# Patient Record
Sex: MALE | Race: WHITE
[De-identification: names, ages, dates, MRNs, and addresses within clinical notes are randomized per-mention and may not be internally consistent; named-entity substitution may affect disease eponyms.]

---

## 2017-02-01 ENCOUNTER — HOSPITAL ENCOUNTER (EMERGENCY)
Dept: HOSPITAL 68 - ERH | Age: 27
End: 2017-02-01
Payer: COMMERCIAL

## 2017-02-01 VITALS — DIASTOLIC BLOOD PRESSURE: 73 MMHG | SYSTOLIC BLOOD PRESSURE: 136 MMHG

## 2017-02-01 DIAGNOSIS — N50.812: Primary | ICD-10-CM

## 2017-02-01 NOTE — ED GI/GU/ABDOMINAL COMPLAINT
History of Present Illness
 
General
Chief Complaint: Male Genitourinary Problems
Stated Complaint: PAIN IN TESTICLE
Source: patient
Exam Limitations: no limitations
 
Vital Signs & Intake/Output
Vital Signs & Intake/Output
 Vital Signs
 
 
Date Time Temp Pulse Resp B/P Pulse O2 O2 Flow FiO2
 
     Ox Delivery Rate 
 
2021 98.3 92 18 136/73 97 Room Air  
 
1849 98.7 88 20 151/89 98 Room Air Room Air 
 
 
Allergies
Coded Allergies:
NO KNOWN ALLERGIES (14)
 
Reconcile Medications
No Known Home Medications
 
Triage Note:
PT TO ED WITH C/O LEFT TESTICULAR PAIN X 1 WEEK,
 HAS APPT WITH , BUT "PAIN RAMPED UP THE LAST
 COUPLE OF DAYS".
Triage Nurses Notes Reviewed? yes
HPI:
This patient is a 26-year-old male who presented to the emergency department 
today for evaluation of left testicular pain 1 week.  The patient reported that
the pain began approximately one week ago after he was doing a, "fat burning 
jog."  He reported that after that time he started to have a, "1.5 out of 10," 
pain in his left testicle.  He reported that today the pain worsened and got up 
to a 5 out of 10.  The pain is currently a 1.5 out of 10.  He was unable to 
describe the pain to me.  He denied any radiation of the pain.  The pain has 
been constant.  The patient denied a chance of any STDs and refused any STD 
testing.  The patient denied any trauma to the area he denied any urethral 
discharge.  The patient denied any urinary burning, urgency, frequency, or blood
in the urine.  He denied any abdominal pain, nausea, vomiting, fevers, chills, 
or back pain
(NATHAN MARINELLI PA-C)
 
Past History
 
Travel History
Traveled to [place] past 21 day No
 
Medical History
Any Pertinent Medical History? see below for history
Neurological: NONE
EENT: NONE
Cardiovascular: NONE
Respiratory: NONE
Gastrointestinal: NONE
Hepatic: NONE
Renal: NONE
Musculoskeletal: NONE
Psychiatric: NONE
Endocrine: NONE
Blood Disorders: NONE
Cancer(s): NONE
GYN/Reproductive: NONE
Tetanus Vaccine: 16
 
Surgical History
Surgical History: non-contributory
 
Psychosocial History
What is your primary language English
Tobacco Use: Never used
ETOH Use: denies use
Illicit Drug Use: denies illicit drug use
 
Family History
Hx Contributory? No
(NATHAN MARINELLI PA-C)
 
Review of Systems
 
Review of Systems
Constitutional:
Reports: no symptoms. 
EENTM:
Reports: no symptoms. 
Respiratory:
Reports: no symptoms. 
Cardiovascular:
Reports: no symptoms. 
GI:
Reports: no symptoms. 
Genitourinary:
Reports: see HPI. 
Musculoskeletal:
Reports: no symptoms. 
Skin:
Reports: no symptoms. 
Neurological/Psychological:
Reports: no symptoms. 
All Other Systems: Reviewed and Negative
(YVES GARNETT,NATHAN)
 
Physical Exam
 
Physical Exam
Gastrointestinal: normal bowel sounds, soft, non-tender, no organomegaly
Comments:
Well-developed well-nourished person in no acute distress
HEENT: Normal EENT exam, head normocephalic, moist mucous membranes
Neck: Supple
Back: Normal gait
Respiratory: No respiratory distress.  Speaking in full sentences
Extremity: Normal and equal pulses
Neuro: Alert oriented x3, cranial nerves II through XII grossly intact.
Skin: No appreciable rash on exposed skin, skin is warm and dry.
Psych: Mood and affect is normal
 
Core Measures
ACS in differential dx? No
Severe Sepsis Present: No
Septic Shock Present: No
(YVES GARNETT,NATHAN)
 
Progress
Differential Diagnosis: appendicitis, biliary colic, bowel obstruction, colon 
cancer, diverticulitis, epididymitis, hernia, orchitis, prostatitis, 
pyelonephritis, STD, testicular torsion, ureterolithiasis, urinary retention, 
urethritis, UTI/pyelo
Plan of Care:
 Orders
 
 
Procedure Date/time Status
 
CULTURE,URINE 1931 Active
 
URINALYSIS 1931 Complete
 
 
 Laboratory Tests
 
 
 
17:
Urine Color YEL, Urine Clarity CLEAR, Urine pH 6.5, Ur Specific Gravity 1.015, 
Urine Protein NEG, Urine Ketones NEG, Urine Nitrite NEG, Urine Bilirubin NEG, 
Urine Urobilinogen 0.2, Ur Leukocyte Esterase NEG, Ur Microscopic EXAM NOT 
REQUIRED, Urine Hemoglobin NEG, Urine Glucose NEG
 Microbiology
1945  URINE ROUT: Urine Culture - RECD
 
Diagnostic Imaging:
Viewed by Me: Ultrasound.  Discussed w/RAD: Ultrasound. 
Radiology Impression: PATIENT: PENELOPE ELIZALDE  MEDICAL RECORD NO: [id number] PRESENT
AGE: 26  PATIENT ACCOUNT NO: [id number] : 88  LOCATION: [facility] ORDERING 
PHYSICIAN: JAVED RUBI     SERVICE DATE:  EXAM TYPE: US - 
US-TESTICULAR EXAMINATION: US SCROTUM CLINICAL INFORMATION: Pain in testicles. 
Assess for torsion or hydrocele. COMPARISON: Ultrasound 2014. TECHNIQUE: A
sonogram of the scrotum was performed assessing gray-scale appearance and color 
Doppler flow. FINDINGS: RIGHT: The right testis measures 4.3 x 2.4 x 3.4 cm, 
volume 24.9 mL. Parenchymal echotexture is normal. No focal testicular 
parenchymal lesions are visualized. Normal symmetric intratesticular flow is 
visualized. The right epididymal head is normal in size; there is a 0.3 cm cyst.
No right hydrocele or varicocele is seen. LEFT: The left testis measures 3.6 x 
2.3 x 3.3 cm, volume 19.4 mL. Parenchymal echotexture is normal. No focal 
testicular parenchymal lesions are visualized. Normal symmetric intratesticular 
flow is visualized. The left epididymal head is normal in size. No left 
hydrocele or varicocele is seen. IMPRESSION: 1. There is normal symmetrical flow
to both testes. 2. There is a small right epididymal head cyst. 3. There are no 
varicoceles or hydroceles. DICTATED BY: REJI MI MD  DATE/TIME DICTATED: :RAD.CHILDS  DATE/TIME TRANSCRIBED:17 
CONFIDENTIAL, DO NOT COPY WITHOUT APPROPRIATE AUTHORIZATION.  <Electronically 
signed in Other Vendor System>                                                  
                                     SIGNED BY: REJI MI MD 17
Initial ED EKG: none
Comments:
2017 8:23:11 PM: This patient has an appointment scheduled for next Tuesday 
with the urologist.  He refused testicular examination.  he also refused any STD
testing.  This patient will be discharged outpatient urology follow-up
(NATHAN MARINELLI PA-C)
 
Departure
 
Departure
Disposition: HOME OR SELF CARE
Condition: Stable
Clinical Impression
Primary Impression: Testicular pain
Referrals:
PATIENT HAS NO PRIMARY CARE DR (PCP/Family)
 
JENNY MARIA MD
 
Additional Instructions:
Please attend your previously scheduled appointment with the urologist.  Return 
to the emergency Department for any worsening symptoms or concerns.
Departure Forms:
Customer Survey
General Discharge Information
Prescriptions:
Current Visit Scripts
No Known Home Medications    
 
(NATHAN MARINELLI PA-C)
 
PA/NP Co-Sign Statement
Statement:
ED Attending supervision documentation-
 
[] I saw and evaluated the patient. I have also reviewed all the pertinent lab 
results and diagnostic results. I agree with the findings and the plan of care 
as documented in the PA's/NP's documentation. 
 
[X] I have reviewed the ED Record and agree with the PA's/NP's documentation.
 
[] Additions or exceptions (if any) to the PAs/NP's note and plan are 
summarized below:
[]
 
(NOAH BELLE,FRANCHESCA)

## 2017-02-01 NOTE — ULTRASOUND REPORT
EXAMINATION:
US SCROTUM
 
CLINICAL INFORMATION:
Pain in testicles. Assess for torsion or hydrocele.
 
COMPARISON:
Ultrasound 06/18/2014.
 
TECHNIQUE:
A sonogram of the scrotum was performed assessing gray-scale appearance and
color Doppler flow.
 
FINDINGS:
 
RIGHT: The right testis measures 4.3 x 2.4 x 3.4 cm, volume 24.9 mL.
Parenchymal echotexture is normal. No focal testicular parenchymal lesions
are visualized. Normal symmetric intratesticular flow is visualized.
 
The right epididymal head is normal in size; there is a 0.3 cm cyst. No right
hydrocele or varicocele is seen.
 
LEFT: The left testis measures 3.6 x 2.3 x 3.3 cm, volume 19.4 mL.
Parenchymal echotexture is normal. No focal testicular parenchymal lesions
are visualized. Normal symmetric intratesticular flow is visualized.
 
The left epididymal head is normal in size. No left hydrocele or varicocele
is seen.
 
IMPRESSION:
1. There is normal symmetrical flow to both testes.
 
2. There is a small right epididymal head cyst.
 
3. There are no varicoceles or hydroceles.

## 2017-06-17 ENCOUNTER — HOSPITAL ENCOUNTER (EMERGENCY)
Dept: HOSPITAL 68 - ERH | Age: 27
End: 2017-06-17
Payer: COMMERCIAL

## 2017-06-17 VITALS — BODY MASS INDEX: 31.86 KG/M2 | HEIGHT: 67 IN | WEIGHT: 203 LBS

## 2017-06-17 VITALS — DIASTOLIC BLOOD PRESSURE: 88 MMHG | SYSTOLIC BLOOD PRESSURE: 136 MMHG

## 2017-06-17 DIAGNOSIS — R10.31: Primary | ICD-10-CM

## 2017-06-17 LAB
ABSOLUTE GRANULOCYTE CT: 5.8 /CUMM (ref 1.4–6.5)
BASOPHILS # BLD: 0 /CUMM (ref 0–0.2)
BASOPHILS NFR BLD: 0.2 % (ref 0–2)
EOSINOPHIL # BLD: 0.1 /CUMM (ref 0–0.7)
EOSINOPHIL NFR BLD: 1.5 % (ref 0–5)
ERYTHROCYTE [DISTWIDTH] IN BLOOD BY AUTOMATED COUNT: 12.7 % (ref 11.5–14.5)
GRANULOCYTES NFR BLD: 65.4 % (ref 42.2–75.2)
HCT VFR BLD CALC: 46.3 % (ref 42–52)
LYMPHOCYTES # BLD: 2.3 /CUMM (ref 1.2–3.4)
MCH RBC QN AUTO: 30 PG (ref 27–31)
MCHC RBC AUTO-ENTMCNC: 33.4 G/DL (ref 33–37)
MCV RBC AUTO: 89.9 FL (ref 80–94)
MONOCYTES # BLD: 0.6 /CUMM (ref 0.1–0.6)
PLATELET # BLD: 336 /CUMM (ref 130–400)
PMV BLD AUTO: 8.9 FL (ref 7.4–10.4)
RED BLOOD CELL CT: 5.15 /CUMM (ref 4.7–6.1)
WBC # BLD AUTO: 8.9 /CUMM (ref 4.8–10.8)

## 2017-06-17 NOTE — ED GI/GU/ABDOMINAL COMPLAINT
History of Present Illness
 
General
Chief Complaint: General Adult
Stated Complaint: PT THINKS HE HAS A APPendagitis
Source: patient
Exam Limitations: no limitations
 
Vital Signs & Intake/Output
Vital Signs & Intake/Output
 Vital Signs
 
 
Date Time Temp Pulse Resp B/P B/P Pulse O2 O2 Flow FiO2
 
     Mean Ox Delivery Rate 
 
06/17 1650       Room Air  
 
06/17 1628 97.7 90 18 136/88  97 Room Air Room Air 
 
 
 ED Intake and Output
 
 
 06/18 0000 06/17 1200
 
Intake Total  
 
Output Total  
 
Balance  
 
   
 
Patient 92.079 kg 
 
Weight  
 
Weight Reported by Patient 
 
Measurement  
 
Method  
 
 
Allergies
Coded Allergies:
NO KNOWN ALLERGIES (06/18/14)
 
Reconcile Medications
No Known Home Medications
 
Triage Note:
TRIAGE:
 26 Y/O MALE PRESENTS C/O "I THINK I HAVE
 APPENDICITIS - 6 WEEKS AGO, I HAD NAUSEA; THEN 2
 WEEKS AGO THE NAUSEA GOT WORSE. 1 WEEK AGO, I
 COULDN'T TELL EXACTLY WHERE THE ABDOMINAL PAIN
 WAS. IN THE LAST FEW HOURS IT HAS RATCHETED UP
 RIGHT INTO THE RIGHT SIDE OF MY ABDOMEN AND A
 DRAMATIC INCREASE OF DIARRHEA. AND THAT IS WHY I
 BELIEVE IT IS APPENDICITIS." C/O PAIN 2/10 X DAYS.
Triage Nurses Notes Reviewed? yes
HPI:
27M NO PMH REPORTS EPISODE OF NAUSEA 6 WEEKS AGO AND THEN AGAIN 2 WEEKS AGO, 
EXPERIENCED RLQ PAIN LAST NIGHT WITH 3 EPISODES OF LOOSE NON-BLOODY STOOLS 
OVERNIGHT, FEELS BETTER TODAY.  DENIES PAIN AT THIS TIME.  DENIES FEVER, CHILLS,
HEADACHE, CHEST PAIN, SOB, ABDOMINAL PAIN, NAUSEA, VOMITING, DIARRHEA, 
CONSTIPATION, MELENA, BLOOD STOOLS, RECTAL PAIN.
 
Past History
 
Travel History
Traveled to [place] past 21 day No
 
Medical History
Any Pertinent Medical History? see below for history
Neurological: NONE
EENT: NONE
Cardiovascular: NONE
Respiratory: NONE
Gastrointestinal: NONE
Hepatic: NONE
Renal: NONE
Musculoskeletal: NONE
Psychiatric: NONE
Endocrine: NONE
Blood Disorders: NONE
Cancer(s): NONE
GYN/Reproductive: NONE
Tetanus Vaccine: 01/17/16
 
Surgical History
Surgical History: non-contributory
 
Psychosocial History
What is your primary language English
Tobacco Use: Never used
ETOH Use: occasional use
Illicit Drug Use: denies illicit drug use
 
Family History
Hx Contributory? No
 
Review of Systems
 
Review of Systems
Constitutional:
Reports: no symptoms. 
EENTM:
Reports: no symptoms. 
Respiratory:
Reports: no symptoms. 
Cardiovascular:
Reports: no symptoms. 
GI:
Reports: see HPI. 
Genitourinary:
Reports: no symptoms. 
Musculoskeletal:
Reports: no symptoms. 
Skin:
Reports: no symptoms. 
Neurological/Psychological:
Reports: no symptoms. 
Hematologic/Endocrine:
Reports: no symptoms. 
Immunologic/Allergic:
Reports: no symptoms. 
All Other Systems: Reviewed and Negative
 
Physical Exam
 
Physical Exam
General Appearance: well developed/nourished, no apparent distress, alert, awake
Head: atraumatic, normal appearance
Eyes:
Bilateral: normal appearance. 
Ears, Nose, Throat, Mouth: hearing grossly normal, moist mucous membrane
Neck: normal inspection, supple, full range of motion
Respiratory: normal breath sounds, chest non-tender, no respiratory distress
Cardiovascular: regular rate/rhythm
Gastrointestinal: normal bowel sounds, soft, non-tender, no organomegaly
Back: normal inspection, normal range of motion
Extremities: normal range of motion
Neurologic/Psych: no motor/sensory deficits, awake, alert, oriented x 3, normal 
gait
 
Core Measures
ACS in differential dx? No
Severe Sepsis Present: No
Septic Shock Present: No
 
Progress
Differential Diagnosis: AAA, AMI, appendicitis, biliary colic, bowel obstruction
, colon cancer, cholecystitis, diverticulitis, epididymitis, esophageal varices,
gastritis, hepatitis, hernia, hemorrhoids, ischemic bowel, inflamm bowel dis, 
Merari-Camilo tear, orchitis, pancreatitis, prostatitis, peptic ulcer, PUD/GERD, 
perforated viscous, pyelonephritis, SBO, STD, testicular torsion, 
ureterolithiasis, urinary retention, urethritis, UTI/pyelo
Plan of Care:
 Orders
 
 
Procedure Date/time Status
 
Add-on Test (ER Only) 06/17 1713 Active
 
URINALYSIS 06/17 1630 Complete
 
C-REACTIVE PROTEIN 06/17 1630 Complete
 
COMPREHENSIVE METABOLIC PANEL 06/17 1630 Complete
 
CBC WITHOUT DIFFERENTIAL 06/17 1630 Complete
 
 
 Laboratory Tests
 
 
 
06/17/17 1730:
Anion Gap 13, Estimated GFR > 60, BUN/Creatinine Ratio 22.5, Glucose 89, Calcium
10.1, Total Bilirubin 0.6, AST 22, ALT 63, Alkaline Phosphatase 68, C-Reactive 
Prot, Quant < 0.5, Total Protein 7.7, Albumin 4.9, Globulin 2.8, Albumin/
Globulin Ratio 1.8, CBC w Diff NO MAN DIFF REQ, RBC 5.15, MCV 89.9, MCH 30.0, 
RDW 12.7, MPV 8.9, Gran % 65.4, Lymphocytes % 26.4, Monocytes % 6.5, Eosinophils
% 1.5, Basophils % 0.2, Absolute Granulocytes 5.8, Absolute Lymphocytes 2.3, 
Absolute Monocytes 0.6, Absolute Eosinophils 0.1, Absolute Basophils 0, PUBS 
MCHC 33.4
 
06/17/17 1645:
Urine Color YEL, Urine Clarity CLEAR, Urine pH 6.0, Ur Specific Gravity <= 1.005
, Urine Protein NEG, Urine Ketones NEG, Urine Nitrite NEG, Urine Bilirubin NEG, 
Urine Urobilinogen 0.2, Ur Leukocyte Esterase NEG, Ur Microscopic EXAM NOT 
REQUIRED, Urine Hemoglobin NEG, Urine Glucose NEG
 
 
CBC, CMP, CRP SENT, ALL NORMAL.  ABDOMINAL EXAM IS COMPLETELY BENIGN WITH NO 
EVIDENCE OF ACUTE ABDOMEN, NO TENDERNESS, NO REBOUND TENDERNESS OR GUARDING.  
(CHARLES BELLE,KENA)
Initial ED EKG: NOT DONE BECAUSE UNNECESSARY
 
Departure
 
Departure
Time of Disposition: 1820
Disposition: HOME OR SELF CARE
Condition: Stable
Clinical Impression
Primary Impression: RLQ abdominal pain
Referrals:
LOY RODRIGUEZ DO
 
PATIENT HAS NO PRIMARY CARE DR (PCP/Family)
 
XIMENA VYAS MD
 
Additional Instructions:
FOLLOW UP WITH YOUR NEW PCP.
Departure Forms:
Customer Survey
General Discharge Information
Prescriptions:
Current Visit Scripts
No Known Home Medications

## 2018-01-11 ENCOUNTER — HOSPITAL ENCOUNTER (EMERGENCY)
Dept: HOSPITAL 68 - ERH | Age: 28
End: 2018-01-11
Payer: COMMERCIAL

## 2018-01-11 VITALS — BODY MASS INDEX: 32.18 KG/M2 | WEIGHT: 205 LBS | HEIGHT: 67 IN

## 2018-01-11 VITALS — DIASTOLIC BLOOD PRESSURE: 81 MMHG | SYSTOLIC BLOOD PRESSURE: 146 MMHG

## 2018-01-11 DIAGNOSIS — M62.89: ICD-10-CM

## 2018-01-11 DIAGNOSIS — F41.9: Primary | ICD-10-CM

## 2018-01-11 NOTE — ED HAND/WRIST INJURY COMPLAINT
History of Present Illness
 
General
Chief Complaint: Upper Extremity Problem
Stated Complaint: PER PT MUSCLE FATIGUE/SHAKEY IN ARMS
Source: patient, old records
Exam Limitations: no limitations
 
Vital Signs & Intake/Output
Vital Signs & Intake/Output
 Vital Signs
 
 
Date Time Temp Pulse Resp B/P B/P Pulse O2 O2 Flow FiO2
 
     Mean Ox Delivery Rate 
 
01/11 1757       Room Air  
 
01/11 1707 98.1 92 15 146/81  96 Room Air Room Air 
 
 
 
Allergies
Coded Allergies:
No Known Allergies (01/11/18)
 
Reconcile Medications
No Known Home Medications
 
Triage Note:
PT TO ED FOR C/C OF FULL BODY ACHES WITH SHAKING X
1 MONTH.  SEEN HERE BEFORE FOR SAME.  PT STATES,
"I'M MAINLY HERE BECAUSE I HAD A PANIC ATTACK OVER
THIS." PT ABLE TO WALK INDEPENDENTLY WITHOUT
DIFFICULTY.  +STEADY GAIT.
Triage Nurses Notes Reviewed? yes
Duration: constant (x 1 month)
Timing: recent history
Injury Environment: home
Severity: moderate
Severity Numbers: 7
Pain/Injury Location:
Bilateral: Arm. 
Method of Injury: unknown
No Modifying Factors: none
HPI:
27-year-old male who is been seen in this emergency room multiple times for 
similar complaints presents again today complaining of bilateral arm spasms and 
shaking having going on for over a month after he had an MRI of his head and 
neck.  The patient states he had an MRI because he was having soreness and 
muscle fatigue throughout this body with weightbearing.  He states the MRI was 
unremarkable.  He states she's been under increased stress at home for his 
living conditions with his mother.  He does not take any medicine on a regular 
basis.  He had blood work performed here 5 days ago that showed a calcium of 
10.7.  He states that he went home and googled wht can cause high calcium and is
concerned that he has cancer.  He saw his primary care physician today who sent 
him for further blood tests including a vitamin D and parathyroid.  The patient 
denies any abdominal pain chest pain shortness of breath.  He is never been on 
medication for his panic attacks which he states are becoming more severe 
secondary to his symptoms.  He denies SI HI.  He is never been seen by 
psychiatrist.  When questioned if the patient like to speak with crisis today he
states that he is setting up speaking with the psychiatrist on an outpatient and
does not want be here long
 
(León RUBI,Gil)
 
Past History
 
Travel History
Traveled to [place] past 21 day No
 
Medical History
Any Pertinent Medical History? see below for history
Neurological: NONE
EENT: NONE
Cardiovascular: NONE
Respiratory: NONE
Gastrointestinal: NONE
Hepatic: NONE
Renal: NONE
Musculoskeletal: NONE
Psychiatric: anxiety
Endocrine: NONE
Blood Disorders: NONE
Cancer(s): NONE
GYN/Reproductive: NONE
Tetanus Vaccine: 01/17/16
 
Surgical History
Surgical History: non-contributory
 
Psychosocial History
What is your primary language English
Tobacco Use: Never used
ETOH Use: denies use
Illicit Drug Use: denies illicit drug use
 
Family History
Hx Contributory? No
(Gil Chavez)
 
Review of Systems
 
Review of Systems
Constitutional:
Reports: see HPI. 
Comments
Review of systems: See HPI, All other systems negative.
Constitutional, no chills no fever, no malaise no weight loss
HEENT:  no sore throat no congestion, no ear pain
Cardiovascular: No chest pain , no palpitation
Skin:  no rashes, no change in skin
Respiratory: No dyspnea no cough no  sputum no  hemoptysis
GI: No nausea no vomiting, no diarrhea, no bloating/constipation
: No dysuria No hematuria, no frequency
Muscle skeletal: No joint pain, no back pain, no neck pain,
Neurologic: , no headache
Psych: No stress no  depression,.
Heme/endocrine: No bruising no bleeding
Immunology: No lymphadenopathy
(Gil Chavez)
 
Physical Exam
 
Physical Exam
General Appearance: well developed/nourished, no apparent distress, alert
Hand Left: normal inspection
Hand Right: normal inspection
Comments:
Well-developed well-nourished patient in no apparent distress.
HEENT: Atraumatic, extraocular motion intact
Neck: Supple, FROM
Back: FROM
Cardiovascular: Regular rate and rhythms no murmurs rubs or gallops, 
Respiratory: Chest nontender.There were no bony deformities, no asymmetry. No 
respiratory distress.  Patient speaking in full complete sentences. Breath 
sounds clear to auscultation bilaterally: NO W/R/R
Shoulder: Atraumatic/Stable. FROM . 
Elbow: Atraumatic/stable. FROM. No laxity
Upper arm/Forearm: Atraumatic. Nontender. No edema, 5 out of 5  strength 
noted to bilateral upper extremities
Hand/Wrist:  Atraumatic/stable. Skin intact.  FROM
Pulses: Normal/equal radial pulses bilaterally. Brisk cap refill
lower Extremities: full range of motion
Neuro: awake, alert, and oriented to person, place and time. There were no 
obvious focal neurologic abnormalities.
Skin: Warm & dry;No appreciable rash on exposed skin
Psych: Mood affect normal, normal memory normal judgment.
 
(Gil Chavez)
 
Progress
Differential Diagnosis: electrolyte abnoramity, anxiety
Plan of Care:
 Orders
 
 
Procedure Date/time Status
 
COMPREHENSIVE METABOLIC PANEL 01/11 1806 Complete
 
CALCIUM 01/11 1806 Complete
 
 
 Laboratory Tests
 
 
 
01/11/18 1828:
Anion Gap 17  H, Estimated GFR > 60, BUN/Creatinine Ratio 18.3, Glucose 96, 
Calcium 10.1, Total Bilirubin 0.6, AST 24, ALT 59, Alkaline Phosphatase 77, 
Total Protein 7.8, Albumin 5.1  H, Globulin 2.7, Albumin/Globulin Ratio 1.9
I spoke with the patient's primary care physician Dr. mondragon over the phone who 
states that she attempted to talk the patient out of coming back to the 
emergency room today.  She states that she believes his symptoms are also 
attributed to his panic and anxiety and is requesting that if he can be seen by 
psychiatrist today.  The patient is refusing speaking with crisis he denies SI 
HI he is requesting repeat calcium level. he is not intoxicated or impaired, pt 
has been thoroughly educated on and i highly recommeneded he stay to speak with 
crisis to at least set up outpatient pysch follow up if inpatient is not 
warranted which the pt is refusing. h
 
I discussed with the patient at length all of their results.  A copy of the lab 
work was provided to the patient  A long discussion with the patient in terms of
sending him home with medication to help with his anxiety, panic attacks 
including Vistaril or Ativan both of which he is refusing.  I had an extensive 
conversation regarding need for close follow up with their primary care 
physician this week as well as return precautions.  I answered all of their 
questions, they feel comfortable with the plan and follow-up care. 
 
(Gil Chavez)
 
Departure
 
Departure
Disposition: HOME OR SELF CARE
Condition: Stable
Clinical Impression
Primary Impression: Anxiety
Secondary Impressions: Muscle fatigue
Referrals:
Patient Has No Primary Care Dr (PCP/Family)
 
Departure Forms:
Customer Survey
General Discharge Information
Prescriptions:
Current Visit Scripts
No Known Home Medications     
 
(Gil Chavez)
 
PA/NP Co-Sign Statement
Statement:
ED Attending supervision documentation-
 
 I saw and evaluated the patient. I have also reviewed all the pertinent lab 
results and diagnostic results. I agree with the findings and the plan of care 
as documented in the PA's/NP's documentation. 
 
x I have reviewed the ED Record and agree with the PA's/NP's documentation.
 
[] Additions or exceptions (if any) to the PAs/NP's note and plan are 
summarized below:
[]
 
(Lisandra BELLE,[place])

## 2018-04-22 ENCOUNTER — HOSPITAL ENCOUNTER (EMERGENCY)
Dept: HOSPITAL 68 - ERH | Age: 28
LOS: 1 days | End: 2018-04-23
Payer: COMMERCIAL

## 2018-04-22 VITALS — DIASTOLIC BLOOD PRESSURE: 91 MMHG | SYSTOLIC BLOOD PRESSURE: 146 MMHG

## 2018-04-22 VITALS — WEIGHT: 202 LBS | BODY MASS INDEX: 31.71 KG/M2 | HEIGHT: 67 IN

## 2018-04-22 DIAGNOSIS — R10.84: Primary | ICD-10-CM

## 2018-04-23 NOTE — ED GI/GU/ABDOMINAL COMPLAINT
History of Present Illness
 
General
Chief Complaint: Abdominal Pain/Flank Pain
Stated Complaint: ABDOMINAL PAIN ON RT SIDE
Source: patient
Exam Limitations: no limitations
 
Vital Signs & Intake/Output
Vital Signs & Intake/Output
 Vital Signs
 
 
Date Time Temp Pulse Resp B/P B/P Pulse O2 O2 Flow FiO2
 
     Mean Ox Delivery Rate 
 
04/22 2320 98.4 85 18 146/91  98 Room Air  
 
 
 ED Intake and Output
 
 
 04/23 0000 04/22 1200
 
Intake Total  
 
Output Total  
 
Balance  
 
   
 
Patient 202 lb 
 
Weight  
 
Weight Estimated 
 
Measurement  
 
Method  
 
 
 
Allergies
Coded Allergies:
No Known Allergies (01/11/18)
 
Reconcile Medications
No Known Home Medications
 
Triage Note:
RECEIVED 28 YO MALE C/O RLQ ADOMINAL PAIN, STARTED
 APPROX ONE -TWO MONTHS AGO, BUT IT IS GETTING
 PROGRESSIVELY WORSE AND MORE FREQUENT WITHIN THE
 LAST WEEK. NO C/O N/V/D. NO FEVERS. PT REPORTS
 SOME TENDERNESS TO AREA AND PAIN SEEMS TO WORSEN
 WITH SOME MOVMENTS
Triage Nurses Notes Reviewed? yes
Onset: Gradual
Duration: week(s):, waxing and waning
Timing: recent history
Quality/Severity: cramping
Location: generalized abdomen
Radiation: no radiation
Activities at Onset: none
Prior Abdominal Problems: similar symptoms
Modifying Factors:
Worsens With: palpation. 
Associated Symptoms: abdominal pain
HPI:
28 yo gentleman
presents with 2 month history of diffuse abdominal pain in right flank and right
lower abdomen.
 
He notes no nausea, vomiting, diarrhea, chills, dysuria, polyuria.
 
He states, "I looked it up on google and it might be appendicitis... I want to 
make sure it's not appendicitis."
 
 
 
Past History
 
Travel History
Traveled to [place] past 21 day No
 
Medical History
Any Pertinent Medical History? see below for history
Neurological: NONE
EENT: NONE
Cardiovascular: NONE
Respiratory: NONE
Gastrointestinal: NONE
Hepatic: NONE
Renal: NONE
Musculoskeletal: NONE
Psychiatric: anxiety
Endocrine: NONE
Blood Disorders: NONE
Cancer(s): NONE
GYN/Reproductive: NONE
Tetanus Vaccine: 01/17/16
 
Surgical History
Surgical History: non-contributory
 
Psychosocial History
Who do you live with Mother
What is your primary language English
Tobacco Use: Never used
 
Family History
Hx Contributory? No
 
Review of Systems
 
Review of Systems
Constitutional:
Reports: no symptoms. 
EENTM:
Reports: no symptoms. 
Respiratory:
Reports: no symptoms. 
Cardiovascular:
Reports: no symptoms. 
GI:
Reports: no symptoms. 
Genitourinary:
Reports: no symptoms. 
Musculoskeletal:
Reports: no symptoms. 
Skin:
Reports: no symptoms. 
Neurological/Psychological:
Reports: no symptoms. 
Hematologic/Endocrine:
Reports: no symptoms. 
Immunologic/Allergic:
Reports: no symptoms. 
All Other Systems: Reviewed and Negative
 
Physical Exam
 
Physical Exam
General Appearance: well developed/nourished, no apparent distress
Head: atraumatic, normal appearance
Eyes:
Bilateral: normal appearance. 
Ears, Nose, Throat, Mouth: hearing grossly normal, moist mucous membrane
Neck: normal inspection, supple, full range of motion
Respiratory: normal breath sounds, chest non-tender, no respiratory distress, 
quiet respiration, lungs clear
Cardiovascular: regular rate/rhythm
Gastrointestinal: normal bowel sounds, soft, mild tenderness at right flank and 
right lower quadrant. no rebound. no guarding. no rosving's sign
Back: normal inspection
Extremities: normal range of motion
Neurologic/Psych: no motor/sensory deficits, awake, alert, oriented x 3, anxious
and slightly agitated
Skin: intact, normal color, warm/dry
 
Core Measures
ACS in differential dx? No
Sepsis Present: No
Sepsis Focused Exam Completed? No
 
Progress
Differential Diagnosis: appendicitis vs musculoskeletal pain vs other. 
Plan of Care:
 Orders
 
 
Procedure Date/time Status
 
URINALYSIS 04/22 2322 Complete
 
 
 Current Medications
 
 
  Sig/Jael Start time  Last
 
Medication Dose  Stop Time Status Admin
 
Ibuprofen 800 MG ONCE ONE 04/23 0200 AC 
 
(Motrin)   04/23 0201  
 
 
 Laboratory Tests
 
 
04/23/18 0057:
Total Bilirubin Cancelled, Direct Bilirubin Cancelled, AST Cancelled, ALT 
Cancelled, Alkaline Phosphatase Cancelled, Total Protein Cancelled, Albumin 
Cancelled, CBC w Diff Cancelled, WBC Cancelled, RBC Cancelled, Hgb Cancelled, 
Hct Cancelled, MCV Cancelled, MCH Cancelled, MCHC Cancelled, RDW Cancelled, Plt 
Count Cancelled, MPV Cancelled
 
04/22/18 2354:
Urine Color YEL, Urine Clarity CLEAR, Urine pH 6.0, Ur Specific Gravity >= 1.030
, Urine Protein NEG, Urine Ketones NEG, Urine Nitrite NEG, Urine Bilirubin NEG, 
Urine Urobilinogen 0.2, Ur Leukocyte Esterase NEG, Ur Microscopic EXAM NOT 
REQUIRED, Urine Hemoglobin NEG, Urine Glucose NEG
 
Initial ED EKG: none
 
Departure
 
Departure
Disposition: HOME OR SELF CARE
Condition: Stable
Clinical Impression
Primary Impression: Abdominal pain
Referrals:
Unknown (PCP/Family)
 
Departure Forms:
Customer Survey
General Discharge Information
Prescriptions:
Current Visit Scripts
No Known Home Medications     
 
Comments
4/23/18, 1:46AM.... discussed with mr. Menendez at great length x 2.... I suggested
labs and a ct scan to assess for concern for appendicities and/or kidney stones.
 He declines both, stating that a prior ct scan was very traumatic for him.  He 
prefers to follow up with gastroenterology tomorrow.  I discussed with him the 
risks and the benefits, including the risk of serious surgical emergency and 
death.  He expressed understanding at this.  Close follow up encouraged.

## 2018-05-06 ENCOUNTER — HOSPITAL ENCOUNTER (EMERGENCY)
Dept: HOSPITAL 68 - ERH | Age: 28
End: 2018-05-06
Payer: COMMERCIAL

## 2018-05-06 VITALS — WEIGHT: 202 LBS | HEIGHT: 67 IN | BODY MASS INDEX: 31.71 KG/M2

## 2018-05-06 VITALS — SYSTOLIC BLOOD PRESSURE: 139 MMHG | DIASTOLIC BLOOD PRESSURE: 93 MMHG

## 2018-05-06 DIAGNOSIS — R51: ICD-10-CM

## 2018-05-06 DIAGNOSIS — M79.1: Primary | ICD-10-CM

## 2018-05-06 NOTE — ED GI/GU/ABDOMINAL COMPLAINT
History of Present Illness
 
General
Chief Complaint: General Adult
Stated Complaint: "HA,N-V+D"
Source: patient
Exam Limitations: no limitations
 
Vital Signs & Intake/Output
Vital Signs & Intake/Output
 Vital Signs
 
 
Date Time Temp Pulse Resp B/P B/P Pulse O2 O2 Flow FiO2
 
     Mean Ox Delivery Rate 
 
05/06 0213 95.1 72 73 139/93  98 Room Air  
 
 
 
Allergies
Coded Allergies:
No Known Allergies (01/11/18)
 
Reconcile Medications
No Known Home Medications
 
Triage Note:
29YO MALE TO TRIAGE W/ HA X 3 D.
 STATES HE "WANTS TO BE CHECKED FOR GADOLIOUM
 POISONING"
Triage Nurses Notes Reviewed? yes
Onset: Gradual
Duration: week(s):, waxing and waning
Timing: recent history
Quality/Severity: body aches
Location: generalized abdomen
HPI:
27yo gentleman
presents to ER seeking to peform a 24 hour urine test for Gadolinium.  "I had an
MRI 6 months ago, and since then I've had body aches, cognitive decline, 
headaches.... I just feel like I have gadolinium toxicity."
 
He notes intermittent headaches, without fever, chills, visual changes.
 
He is otherwise well. 
 
Past History
 
Travel History
Traveled to [place] past 21 day No
 
Medical History
Any Pertinent Medical History? see below for history
Neurological: NONE
EENT: NONE
Cardiovascular: NONE
Respiratory: NONE
Gastrointestinal: NONE
Hepatic: NONE
Renal: NONE
Musculoskeletal: NONE
Psychiatric: anxiety
Endocrine: NONE
Blood Disorders: NONE
Cancer(s): NONE
GYN/Reproductive: NONE
Tetanus Vaccine: 01/17/16
 
Surgical History
Surgical History: non-contributory
 
Psychosocial History
Who do you live with Mother
What is your primary language English
Tobacco Use: Never used
 
Family History
Hx Contributory? No
 
Review of Systems
 
Review of Systems
Constitutional:
Reports: no symptoms. 
EENTM:
Reports: no symptoms. 
Respiratory:
Reports: no symptoms. 
Cardiovascular:
Reports: no symptoms. 
GI:
Reports: no symptoms. 
Genitourinary:
Reports: no symptoms. 
Musculoskeletal:
Reports: no symptoms. 
Skin:
Reports: no symptoms. 
Neurological/Psychological:
Reports: no symptoms. 
Hematologic/Endocrine:
Reports: no symptoms. 
Immunologic/Allergic:
Reports: no symptoms. 
All Other Systems: Reviewed and Negative
 
Physical Exam
 
Physical Exam
General Appearance: well developed/nourished, anxious
Head: atraumatic, normal appearance
Eyes:
Bilateral: normal appearance, PERRL, EOMI. 
Ears, Nose, Throat, Mouth: hearing grossly normal, moist mucous membrane
Neck: normal inspection, supple, full range of motion
Respiratory: normal breath sounds, chest non-tender, no respiratory distress, 
quiet respiration, lungs clear
Cardiovascular: regular rate/rhythm
Gastrointestinal: normal bowel sounds, soft, non-tender, no organomegaly
Back: normal inspection, normal range of motion
Extremities: normal range of motion
Neurologic/Psych: no motor/sensory deficits, awake, alert, oriented x 3
Skin: intact, normal color, warm/dry
 
Core Measures
ACS in differential dx? No
Sepsis Present: No
Sepsis Focused Exam Completed? No
 
Progress
Differential Diagnosis: headache, myalgia, anxiety vs other. 
Plan of Care:
 Laboratory Tests
 
 
05/06/18 0214:
Total Bilirubin Cancelled, Direct Bilirubin Cancelled, AST Cancelled, ALT 
Cancelled, Alkaline Phosphatase Cancelled, Total Protein Cancelled, Albumin 
Cancelled, CBC w Diff Cancelled, WBC Cancelled, RBC Cancelled, Hgb Cancelled, 
Hct Cancelled, MCV Cancelled, MCH Cancelled, MCHC Cancelled, RDW Cancelled, Plt 
Count Cancelled, MPV Cancelled
 
Initial ED EKG: none
 
Departure
 
Departure
Disposition: HOME OR SELF CARE
Condition: Stable
Clinical Impression
Primary Impression: Myalgia
Secondary Impressions: Headache
Referrals:
Unknown (PCP/Family)
 
Departure Forms:
Customer Survey
General Discharge Information
Prescriptions:
Current Visit Scripts
No Known Home Medications     
 
Comments
5/6/18, 2:55am... discussed at length... unable to perform 24 hour urine 
specimen collection... pt referred to his neurologist and pmd.  he has an 
otherwise benign exam and benign prior evaluations in the ED.